# Patient Record
Sex: MALE | Race: WHITE | Employment: OTHER | URBAN - METROPOLITAN AREA
[De-identification: names, ages, dates, MRNs, and addresses within clinical notes are randomized per-mention and may not be internally consistent; named-entity substitution may affect disease eponyms.]

---

## 2017-12-14 ENCOUNTER — HOSPITAL ENCOUNTER (OUTPATIENT)
Age: 82
Setting detail: OBSERVATION
Discharge: HOME | End: 2017-12-15
Attending: EMERGENCY MEDICINE | Admitting: FAMILY MEDICINE
Payer: MEDICARE

## 2017-12-14 DIAGNOSIS — R04.0 EPISTAXIS: Primary | ICD-10-CM

## 2017-12-14 LAB
ABSOLUTE EOS #: 0.13 K/UL (ref 0–0.44)
ABSOLUTE IMMATURE GRANULOCYTE: <0.03 K/UL (ref 0–0.3)
ABSOLUTE LYMPH #: 1.08 K/UL (ref 1.1–3.7)
ABSOLUTE MONO #: 0.56 K/UL (ref 0.1–1.2)
BASOPHILS # BLD: 0 % (ref 0–2)
BASOPHILS ABSOLUTE: <0.03 K/UL (ref 0–0.2)
DIFFERENTIAL TYPE: ABNORMAL
EOSINOPHILS RELATIVE PERCENT: 2 % (ref 1–4)
HCT VFR BLD CALC: 38.9 % (ref 40.7–50.3)
HEMOGLOBIN: 12.6 G/DL (ref 13–17)
IMMATURE GRANULOCYTES: 0 %
INR BLD: 1.6
LYMPHOCYTES # BLD: 16 % (ref 24–43)
MCH RBC QN AUTO: 30.9 PG (ref 25.2–33.5)
MCHC RBC AUTO-ENTMCNC: 32.4 G/DL (ref 28.4–34.8)
MCV RBC AUTO: 95.3 FL (ref 82.6–102.9)
MONOCYTES # BLD: 8 % (ref 3–12)
PDW BLD-RTO: 13.8 % (ref 11.8–14.4)
PLATELET # BLD: 171 K/UL (ref 138–453)
PLATELET ESTIMATE: ABNORMAL
PMV BLD AUTO: 9.9 FL (ref 8.1–13.5)
PROTHROMBIN TIME: 16.9 SEC (ref 9.4–12.6)
RBC # BLD: 4.08 M/UL (ref 4.21–5.77)
RBC # BLD: ABNORMAL 10*6/UL
SEG NEUTROPHILS: 74 % (ref 36–65)
SEGMENTED NEUTROPHILS ABSOLUTE COUNT: 5.08 K/UL (ref 1.5–8.1)
WBC # BLD: 6.9 K/UL (ref 3.5–11.3)
WBC # BLD: ABNORMAL 10*3/UL

## 2017-12-14 PROCEDURE — 30901 CONTROL OF NOSEBLEED: CPT

## 2017-12-14 PROCEDURE — G0378 HOSPITAL OBSERVATION PER HR: HCPCS

## 2017-12-14 PROCEDURE — 85610 PROTHROMBIN TIME: CPT

## 2017-12-14 PROCEDURE — 99284 EMERGENCY DEPT VISIT MOD MDM: CPT

## 2017-12-14 PROCEDURE — 6370000000 HC RX 637 (ALT 250 FOR IP): Performed by: STUDENT IN AN ORGANIZED HEALTH CARE EDUCATION/TRAINING PROGRAM

## 2017-12-14 PROCEDURE — 2580000003 HC RX 258: Performed by: NURSE PRACTITIONER

## 2017-12-14 PROCEDURE — 85025 COMPLETE CBC W/AUTO DIFF WBC: CPT

## 2017-12-14 PROCEDURE — 2500000003 HC RX 250 WO HCPCS: Performed by: STUDENT IN AN ORGANIZED HEALTH CARE EDUCATION/TRAINING PROGRAM

## 2017-12-14 RX ORDER — SODIUM CHLORIDE 9 MG/ML
INJECTION, SOLUTION INTRAVENOUS CONTINUOUS
Status: DISCONTINUED | OUTPATIENT
Start: 2017-12-14 | End: 2017-12-15 | Stop reason: HOSPADM

## 2017-12-14 RX ORDER — WATER 1000 ML/1000ML
30 INJECTION, SOLUTION INTRAVENOUS ONCE
Status: COMPLETED | OUTPATIENT
Start: 2017-12-14 | End: 2017-12-14

## 2017-12-14 RX ORDER — MORPHINE SULFATE 4 MG/ML
4 INJECTION, SOLUTION INTRAMUSCULAR; INTRAVENOUS
Status: DISCONTINUED | OUTPATIENT
Start: 2017-12-14 | End: 2017-12-15 | Stop reason: HOSPADM

## 2017-12-14 RX ORDER — FINASTERIDE 5 MG/1
5 TABLET, FILM COATED ORAL DAILY
Status: DISCONTINUED | OUTPATIENT
Start: 2017-12-14 | End: 2017-12-15 | Stop reason: HOSPADM

## 2017-12-14 RX ORDER — LIDOCAINE HYDROCHLORIDE 20 MG/ML
1 INJECTION, SOLUTION INFILTRATION; PERINEURAL ONCE
Status: COMPLETED | OUTPATIENT
Start: 2017-12-14 | End: 2017-12-14

## 2017-12-14 RX ORDER — ESCITALOPRAM OXALATE 10 MG/1
10 TABLET ORAL DAILY
Status: DISCONTINUED | OUTPATIENT
Start: 2017-12-14 | End: 2017-12-15 | Stop reason: HOSPADM

## 2017-12-14 RX ORDER — POTASSIUM CHLORIDE 20 MEQ/1
40 TABLET, EXTENDED RELEASE ORAL PRN
Status: DISCONTINUED | OUTPATIENT
Start: 2017-12-14 | End: 2017-12-15 | Stop reason: HOSPADM

## 2017-12-14 RX ORDER — CHOLESTYRAMINE LIGHT 4 G/5.7G
1 POWDER, FOR SUSPENSION ORAL 2 TIMES DAILY
Status: DISCONTINUED | OUTPATIENT
Start: 2017-12-14 | End: 2017-12-15 | Stop reason: HOSPADM

## 2017-12-14 RX ORDER — QUETIAPINE FUMARATE 25 MG/1
50 TABLET, FILM COATED ORAL ONCE
Status: COMPLETED | OUTPATIENT
Start: 2017-12-14 | End: 2017-12-14

## 2017-12-14 RX ORDER — NICOTINE 21 MG/24HR
1 PATCH, TRANSDERMAL 24 HOURS TRANSDERMAL DAILY PRN
Status: DISCONTINUED | OUTPATIENT
Start: 2017-12-14 | End: 2017-12-15 | Stop reason: HOSPADM

## 2017-12-14 RX ORDER — SODIUM CHLORIDE 0.9 % (FLUSH) 0.9 %
10 SYRINGE (ML) INJECTION PRN
Status: DISCONTINUED | OUTPATIENT
Start: 2017-12-14 | End: 2017-12-15 | Stop reason: HOSPADM

## 2017-12-14 RX ORDER — POTASSIUM CHLORIDE 7.45 MG/ML
10 INJECTION INTRAVENOUS PRN
Status: DISCONTINUED | OUTPATIENT
Start: 2017-12-14 | End: 2017-12-15 | Stop reason: HOSPADM

## 2017-12-14 RX ORDER — TAMSULOSIN HYDROCHLORIDE 0.4 MG/1
0.4 CAPSULE ORAL DAILY
Status: DISCONTINUED | OUTPATIENT
Start: 2017-12-14 | End: 2017-12-15 | Stop reason: HOSPADM

## 2017-12-14 RX ORDER — HYDROCODONE BITARTRATE AND ACETAMINOPHEN 5; 325 MG/1; MG/1
1 TABLET ORAL EVERY 4 HOURS PRN
Status: DISCONTINUED | OUTPATIENT
Start: 2017-12-14 | End: 2017-12-15 | Stop reason: HOSPADM

## 2017-12-14 RX ORDER — ONDANSETRON 2 MG/ML
4 INJECTION INTRAMUSCULAR; INTRAVENOUS EVERY 6 HOURS PRN
Status: DISCONTINUED | OUTPATIENT
Start: 2017-12-14 | End: 2017-12-15 | Stop reason: HOSPADM

## 2017-12-14 RX ORDER — OXYBUTYNIN CHLORIDE 5 MG/1
5 TABLET ORAL 2 TIMES DAILY
Status: DISCONTINUED | OUTPATIENT
Start: 2017-12-14 | End: 2017-12-15 | Stop reason: HOSPADM

## 2017-12-14 RX ORDER — DONEPEZIL HYDROCHLORIDE 10 MG/1
20 TABLET, FILM COATED ORAL NIGHTLY
Status: DISCONTINUED | OUTPATIENT
Start: 2017-12-14 | End: 2017-12-15 | Stop reason: HOSPADM

## 2017-12-14 RX ORDER — POTASSIUM CHLORIDE 20MEQ/15ML
40 LIQUID (ML) ORAL PRN
Status: DISCONTINUED | OUTPATIENT
Start: 2017-12-14 | End: 2017-12-15 | Stop reason: HOSPADM

## 2017-12-14 RX ORDER — SODIUM CHLORIDE 0.9 % (FLUSH) 0.9 %
10 SYRINGE (ML) INJECTION EVERY 12 HOURS SCHEDULED
Status: DISCONTINUED | OUTPATIENT
Start: 2017-12-14 | End: 2017-12-15 | Stop reason: HOSPADM

## 2017-12-14 RX ORDER — CHOLECALCIFEROL (VITAMIN D3) 125 MCG
1000 CAPSULE ORAL DAILY
Status: DISCONTINUED | OUTPATIENT
Start: 2017-12-14 | End: 2017-12-15 | Stop reason: HOSPADM

## 2017-12-14 RX ORDER — LEVETIRACETAM 250 MG/1
100 TABLET ORAL DAILY
Status: DISCONTINUED | OUTPATIENT
Start: 2017-12-14 | End: 2017-12-15 | Stop reason: HOSPADM

## 2017-12-14 RX ORDER — ISOSORBIDE MONONITRATE 30 MG/1
30 TABLET, EXTENDED RELEASE ORAL DAILY
Status: DISCONTINUED | OUTPATIENT
Start: 2017-12-14 | End: 2017-12-15 | Stop reason: HOSPADM

## 2017-12-14 RX ORDER — QUETIAPINE FUMARATE 25 MG/1
50 TABLET, FILM COATED ORAL NIGHTLY
Status: DISCONTINUED | OUTPATIENT
Start: 2017-12-14 | End: 2017-12-15 | Stop reason: HOSPADM

## 2017-12-14 RX ORDER — BISACODYL 10 MG
10 SUPPOSITORY, RECTAL RECTAL DAILY PRN
Status: DISCONTINUED | OUTPATIENT
Start: 2017-12-14 | End: 2017-12-15 | Stop reason: HOSPADM

## 2017-12-14 RX ORDER — ACETAMINOPHEN 325 MG/1
650 TABLET ORAL EVERY 4 HOURS PRN
Status: DISCONTINUED | OUTPATIENT
Start: 2017-12-14 | End: 2017-12-15 | Stop reason: HOSPADM

## 2017-12-14 RX ORDER — MORPHINE SULFATE 2 MG/ML
2 INJECTION, SOLUTION INTRAMUSCULAR; INTRAVENOUS
Status: DISCONTINUED | OUTPATIENT
Start: 2017-12-14 | End: 2017-12-15 | Stop reason: HOSPADM

## 2017-12-14 RX ORDER — OXYMETAZOLINE HYDROCHLORIDE 0.05 G/100ML
1 SPRAY NASAL ONCE
Status: COMPLETED | OUTPATIENT
Start: 2017-12-14 | End: 2017-12-14

## 2017-12-14 RX ORDER — MAGNESIUM SULFATE 1 G/100ML
1 INJECTION INTRAVENOUS PRN
Status: DISCONTINUED | OUTPATIENT
Start: 2017-12-14 | End: 2017-12-15 | Stop reason: HOSPADM

## 2017-12-14 RX ADMIN — WATER 30 ML: 100 INJECTION, SOLUTION INTRAVENOUS at 17:00

## 2017-12-14 RX ADMIN — SODIUM CHLORIDE: 9 INJECTION, SOLUTION INTRAVENOUS at 23:00

## 2017-12-14 RX ADMIN — QUETIAPINE FUMARATE 50 MG: 25 TABLET ORAL at 17:49

## 2017-12-14 RX ADMIN — LIDOCAINE HYDROCHLORIDE 1 ML: 20 INJECTION, SOLUTION INFILTRATION; PERINEURAL at 16:30

## 2017-12-14 RX ADMIN — OXYMETAZOLINE HYDROCHLORIDE 1 SPRAY: 5 SPRAY NASAL at 16:30

## 2017-12-14 ASSESSMENT — ENCOUNTER SYMPTOMS
VOMITING: 0
NAUSEA: 0
BACK PAIN: 0
COUGH: 1
COLOR CHANGE: 0
ABDOMINAL PAIN: 0

## 2017-12-14 ASSESSMENT — PAIN SCALES - GENERAL
PAINLEVEL_OUTOF10: 3
PAINLEVEL_OUTOF10: 0

## 2017-12-14 NOTE — ED NOTES
Pt here via EMS for epistaxis. Pt was at [facility] ER yesterday, nasal tissue was cauterized and packed, pt was then sent home. Pt family reports that pt has alzheimer's , and had pulled rhino packing out last night. Epistaxis has been intermittent since then. Pt denies any pain. ECF reports that pt has had black tarry stool. Pt denies any pain, pt is pleasantly confused. Pt family present and demanding answers as to why he is having continued nosebleeds. Will continue to monitor.      Evangelina Irwin RN  12/14/17 3918

## 2017-12-14 NOTE — ED NOTES
Bed: 34  Expected date:   Expected time:   Means of arrival:   Comments:  [facility]     Joe Mckeon RN  12/14/17 1300

## 2017-12-14 NOTE — ED PROVIDER NOTES
[facility]  Emergency Department Encounter  Emergency Medicine Resident     Pt Name: Elton Adams  MRN: [id number]  Evansgfcrys 5/4/1929  Date of evaluation: 12/14/17  PCP:  Vicenta Garcia DO    CHIEF COMPLAINT       Chief Complaint   Patient presents with    Epistaxis       HISTORY OF PRESENT ILLNESS  (Location/Symptom, Timing/Onset, Context/Setting, Quality, Duration, Modifying Factors, Severity.)      Elton Adams is a 80 y.o. male who presents With epistaxis ×2 days. Patient [place] [facility] [place] ago,  the bleeding was cauterized, packing was placed, and patient was discharged home. Bleeding returned that night, patient was again brought to [facility]. The bleeding was cauterized again, and a balloon packing was placed. Approximately 6:30 this morning patient pulled out the balloon packing, and since then, his nose has been bleeding. His daughter reports significant amount of blood, that is causing him to cough up large clots, with bleeding greater from the right nares versus the left. Patient is on warfarin for history of A. fib, also has a pig valve and pacemaker, last INR was 1.8. Nursing home staff also reported recent black tarry stools, patient does have a remote history of colon cancer. Has a history of Alzheimer's, and is confused and not oriented at baseline, however denies any complaints when asked. Has not mentioned any complaints otherwise to his family. REVIEW OF SYSTEMS    (2-9 systems for level 4, 10 or more for level 5)      Review of Systems   Constitutional: Negative for chills and fever. HENT: Positive for nosebleeds. Negative for congestion and sneezing. Eyes: Negative for visual disturbance. Respiratory: Positive for cough. Gastrointestinal: Negative for abdominal pain, nausea and vomiting. Black stools   Genitourinary: Negative for dysuria. Musculoskeletal: Negative for back pain. Skin: Negative for color change and rash.    Neurological: 4/23/15   Historical Provider, MD   QUEtiapine (SEROQUEL) 50 MG tablet  4/8/15   Historical Provider, MD   warfarin (COUMADIN) 1 MG tablet Take 3 mg by mouth daily  5/3/15   Nicklas Mealing, DO   vitamin B-12 (CYANOCOBALAMIN) 1000 MCG tablet Take 1,000 mcg by mouth daily    Historical Provider, MD   tamsulosin (FLOMAX) 0.4 MG capsule Take 0.4 mg by mouth daily    Historical Provider, MD   donepezil (ARICEPT) 10 MG tablet Take 20 mg by mouth nightly     Nicklas Mealing, DO   oxybutynin (DITROPAN) 5 MG tablet Take 5 mg by mouth 2 times daily    Historical Provider, MD   cholestyramine Debbie Crome) 4 G packet Take 1 packet by mouth 2 times daily 5/19/15   Rj Alejandro MD     PHYSICAL EXAM   (up to 7 for level 4, 8 or more for level 5)      INITIAL VITALS:    weight is 180 lb (81.6 kg). His axillary temperature is 98.2 °F (36.8 °C). His blood pressure is 108/65 and his pulse is 93. His respiration is 17 and oxygen saturation is 93%. Physical Exam   Constitutional: He appears well-developed and well-nourished. HENT:   Head: Normocephalic and atraumatic. Mouth/Throat: Oropharynx is clear and moist.   Nasal clamp in place  Mild amount of blood noted in the posterior pharynx  Blood noted at exterior of R nares, no blood from L nares   Eyes: EOM are normal. Pupils are equal, round, and reactive to light. Neck: Normal range of motion. Neck supple. Cardiovascular: Normal rate, regular rhythm, normal heart sounds and intact distal pulses. Exam reveals no gallop and no friction rub. No murmur heard. Pulmonary/Chest: Effort normal and breath sounds normal.   Abdominal: Soft. Bowel sounds are normal. He exhibits no distension. There is no tenderness. There is no rebound and no guarding. Genitourinary:   Genitourinary Comments: Stool melanotic and guaiac positive   Musculoskeletal: Normal range of motion. He exhibits no edema. Neurological: He is alert. Skin: Skin is warm and dry. No rash noted. Placed This Encounter   Medications    cholestyramine light packet 4 g    donepezil (ARICEPT) tablet 20 mg    escitalopram (LEXAPRO) tablet 10 mg    finasteride (PROSCAR) tablet 5 mg    isosorbide mononitrate (IMDUR) extended release tablet 30 mg    metoprolol tartrate (LOPRESSOR) tablet 25 mg    oxybutynin (DITROPAN) tablet 5 mg    QUEtiapine (SEROQUEL) tablet 50 mg    tamsulosin (FLOMAX) capsule 0.4 mg    vitamin B-12 (CYANOCOBALAMIN) tablet 1,000 mcg    levETIRAcetam (KEPPRA) tablet 125 mg    sodium chloride flush 0.9 % injection 10 mL    sodium chloride flush 0.9 % injection 10 mL    OR Linked Order Group     potassium chloride (KLOR-CON M) extended release tablet 40 mEq     potassium chloride 20 MEQ/15ML (10%) oral solution 40 mEq     potassium chloride 10 mEq/100 mL IVPB (Peripheral Line)    magnesium sulfate 1 g in dextrose 5% 100 mL IVPB    acetaminophen (TYLENOL) tablet 650 mg    HYDROcodone-acetaminophen (NORCO) 5-325 MG per tablet 1 tablet    OR Linked Order Group     morphine injection 2 mg     morphine (PF) injection 4 mg    magnesium hydroxide (MILK OF MAGNESIA) 400 MG/5ML suspension 30 mL    bisacodyl (DULCOLAX) suppository 10 mg    ondansetron (ZOFRAN) injection 4 mg    nicotine (NICODERM CQ) 21 MG/24HR 1 patch     If indicated/pateint smokes    oxymetazoline (AFRIN) 0.05 % nasal spray 1 spray    lidocaine 2 % injection 1 mL    sterile water injection 30 mL    QUEtiapine (SEROQUEL) tablet 50 mg    0.9 % sodium chloride infusion     DDX: anterior vs posterior nasal bleed, on coumadin    DIAGNOSTIC RESULTS      LABS:  Results for orders placed or performed during the hospital encounter of 12/14/17   Protime-INR   Result Value Ref Range    Protime 16.9 (H) 9.4 - 12.6 sec    INR 1.6    CBC WITH AUTO DIFFERENTIAL   Result Value Ref Range    WBC 6.9 3.5 - 11.3 k/uL    RBC 4.08 (L) 4.21 - 5.77 m/uL    Hemoglobin 12.6 (L) 13.0 - 17.0 g/dL    Hematocrit 38.9 (L) 40.7 - 50.3 %

## 2017-12-15 VITALS
TEMPERATURE: 98.6 F | OXYGEN SATURATION: 95 % | SYSTOLIC BLOOD PRESSURE: 155 MMHG | BODY MASS INDEX: 25.83 KG/M2 | HEART RATE: 100 BPM | DIASTOLIC BLOOD PRESSURE: 90 MMHG | WEIGHT: 180 LBS | RESPIRATION RATE: 16 BRPM

## 2017-12-15 LAB
ALBUMIN SERPL-MCNC: 3.4 G/DL (ref 3.5–5.2)
ALBUMIN/GLOBULIN RATIO: 1.4 (ref 1–2.5)
ALP BLD-CCNC: 83 U/L (ref 40–129)
ALT SERPL-CCNC: 8 U/L (ref 5–41)
ANION GAP SERPL CALCULATED.3IONS-SCNC: 16 MMOL/L (ref 9–17)
AST SERPL-CCNC: 10 U/L
BILIRUB SERPL-MCNC: 1.78 MG/DL (ref 0.3–1.2)
BUN BLDV-MCNC: 19 MG/DL (ref 8–23)
BUN/CREAT BLD: ABNORMAL (ref 9–20)
CALCIUM SERPL-MCNC: 7.9 MG/DL (ref 8.6–10.4)
CHLORIDE BLD-SCNC: 103 MMOL/L (ref 98–107)
CO2: 23 MMOL/L (ref 20–31)
CREAT SERPL-MCNC: 0.95 MG/DL (ref 0.7–1.2)
GFR AFRICAN AMERICAN: >60 ML/MIN
GFR NON-AFRICAN AMERICAN: >60 ML/MIN
GFR SERPL CREATININE-BSD FRML MDRD: ABNORMAL ML/MIN/{1.73_M2}
GFR SERPL CREATININE-BSD FRML MDRD: ABNORMAL ML/MIN/{1.73_M2}
GLUCOSE BLD-MCNC: 121 MG/DL (ref 70–99)
HCT VFR BLD CALC: 32.8 % (ref 40.7–50.3)
HEMOGLOBIN: 10.7 G/DL (ref 13–17)
INR BLD: 1.5
MCH RBC QN AUTO: 31 PG (ref 25.2–33.5)
MCHC RBC AUTO-ENTMCNC: 32.6 G/DL (ref 28.4–34.8)
MCV RBC AUTO: 95.1 FL (ref 82.6–102.9)
PDW BLD-RTO: 13.9 % (ref 11.8–14.4)
PLATELET # BLD: 148 K/UL (ref 138–453)
PMV BLD AUTO: 10.1 FL (ref 8.1–13.5)
POTASSIUM SERPL-SCNC: 3.9 MMOL/L (ref 3.7–5.3)
PROTHROMBIN TIME: 15.9 SEC (ref 9.4–12.6)
RBC # BLD: 3.45 M/UL (ref 4.21–5.77)
SODIUM BLD-SCNC: 142 MMOL/L (ref 135–144)
TOTAL PROTEIN: 5.8 G/DL (ref 6.4–8.3)
WBC # BLD: 8.2 K/UL (ref 3.5–11.3)

## 2017-12-15 PROCEDURE — 97530 THERAPEUTIC ACTIVITIES: CPT

## 2017-12-15 PROCEDURE — G8978 MOBILITY CURRENT STATUS: HCPCS

## 2017-12-15 PROCEDURE — 36415 COLL VENOUS BLD VENIPUNCTURE: CPT

## 2017-12-15 PROCEDURE — 97166 OT EVAL MOD COMPLEX 45 MIN: CPT

## 2017-12-15 PROCEDURE — 80053 COMPREHEN METABOLIC PANEL: CPT

## 2017-12-15 PROCEDURE — G8987 SELF CARE CURRENT STATUS: HCPCS

## 2017-12-15 PROCEDURE — 85027 COMPLETE CBC AUTOMATED: CPT

## 2017-12-15 PROCEDURE — 85610 PROTHROMBIN TIME: CPT

## 2017-12-15 PROCEDURE — G0378 HOSPITAL OBSERVATION PER HR: HCPCS

## 2017-12-15 PROCEDURE — 97162 PT EVAL MOD COMPLEX 30 MIN: CPT

## 2017-12-15 PROCEDURE — 99222 1ST HOSP IP/OBS MODERATE 55: CPT | Performed by: FAMILY MEDICINE

## 2017-12-15 PROCEDURE — 97535 SELF CARE MNGMENT TRAINING: CPT

## 2017-12-15 PROCEDURE — G8988 SELF CARE GOAL STATUS: HCPCS

## 2017-12-15 PROCEDURE — G8979 MOBILITY GOAL STATUS: HCPCS

## 2017-12-15 NOTE — CARE COORDINATION
Attempt to discuss discharge plan with patient- patient has dementia and is unable to carry conversation. Chart notes indicate patient is from [place]- called facility- verified patient is a long term bed hold and can return when medically cleared for discharge.

## 2017-12-15 NOTE — DISCHARGE SUMMARY
is  40 mins in patient examination, evaluation, counseling as well as medication reconciliation, prescriptions for required medications, discharge plan and follow up. Electronically signed by   Rain Pierson MD  12/15/2017  9:37 AM      Thank you Dr. Lavon Diaz DO for the opportunity to be involved in this patient's care.

## 2017-12-15 NOTE — FLOWSHEET NOTE
Spoke with Dr. Rosa Mcdermott regarding discharge plans. Per Dr. Rosa Mcdermott the patient needs to be off his anticoagulents for 7 days for nose bleed sight to heal . Patient would need to be seen in the office of Dr Rosa Mcdermott on Monday December 18,2017.

## 2017-12-15 NOTE — PROGRESS NOTES
Occupational Therapy   Occupational Therapy Initial Assessment  Date: 12/15/2017   Patient Name: Varghese Valerio  MRN: [id number]     : 1929    Patient Diagnosis(es): The encounter diagnosis was Epistaxis. has a past medical history of Atrial fibrillation ([facility] [address] BPH (benign prostatic hyperplasia); Dementia due to Alzheimer's disease; Diarrhea; Dyslipidemia; Gastritis; History of colon cancer; and Hypertension. has a past surgical history that includes Colonoscopy () and Pacemaker insertion (). Restrictions  Restrictions/Precautions  Restrictions/Precautions: Fall Risk  Required Braces or Orthoses?: No  Position Activity Restriction  Other position/activity restrictions: up with assist    Subjective   General  Chart Reviewed: No  Patient assessed for rehabilitation services?: Yes  Family / Caregiver Present: No  General Comment  Comments: RN ok'd for therapy this date. Pt agreeable to participate in session and cooperative throughout. Pain Assessment  Patient Currently in Pain: No  Pain Assessment: 0-10  Pain Level: 0     Social/Functional History  Social/Functional History  Lives With: Other (comment)  Type of Home: Facility  Home Layout: One level  Home Equipment: Cane  ADL Assistance: Needs assistance (Pt reports he does get assistance with bathing and dressing at the facility)  Homemaking Assistance: Needs assistance  Homemaking Responsibilities: No (facility performs all)  Ambulation Assistance: Needs assistance (Pt reports he walks to the bathroom with assist from the staff at facility. Pt reports someone is always with him when he gets up)  Transfer Assistance: Needs assistance  Active : No  IADL Comments: Pt is a questionable historian this date but is able to answer simple questions appropriately this date.       Objective   Vision: Within Functional Limits  Hearing: Within functional limits    Orientation  Overall Orientation Status: Impaired  Orientation Level: Oriented to Status (): At least 60 percent but less than 80 percent impaired, limited or restricted  Self Care Goal Status (): At least 40 percent but less than 60 percent impaired, limited or restricted    Goals  Short term goals  Time Frame for Short term goals: Pt will, by discharge:  Short term goal 1: perform bed mobility with CGA and VCs PRN for increased independence with functional transfers  Short term goal 2: perform functional transfers with min A and use of least restrictive AD  Short term goal 3: demo 3+ minutes standing tolerance with use of least restrictive AD for increased participation in ADLs  Short term goal 4: perform feeding/grooming tasks with setup and VCs PRN  Short term goal 5: perform UB ADL tasks with min A and VCs PRN     Therapy Time   Individual Concurrent Group Co-treatment   Time In 0957         Time Out 1018         Minutes 21            Discharge recommendations discussed with patient during initial evaluation.     JOE Cerda/L

## 2017-12-15 NOTE — FLOWSHEET NOTE
Patient given complete bed bath and brief changed. Lotion and powder to extremities. Tolerated well. Transport arrived at completion.

## 2017-12-15 NOTE — PROGRESS NOTES
all)  Ambulation Assistance: Needs assistance (Pt reports he walks to the bathroom with assist from the staff at facility. Pt reports someone is always with him when he gets up)  Transfer Assistance: Needs assistance  Active : No  IADL Comments: Pt is a questionable historian this date but is able to answer simple questions appropriately this date. Objective          AROM RLE (degrees)  RLE AROM: WFL  AROM LLE (degrees)  LLE AROM : WFL  AROM RUE (degrees)  RUE AROM : WFL  AROM LUE (degrees)  LUE AROM : WFL  Strength RLE  Comment: grossly 4-/5  Strength LLE  Comment: grossly 4-/5        Bed mobility  Rolling to Left: Minimal assistance  Rolling to Right: Minimal assistance  Supine to Sit: Moderate assistance  Sit to Supine: Moderate assistance  Scooting: Moderate assistance  Transfers  Sit to Stand: Moderate Assistance (x2)  Stand to sit: Moderate Assistance (x2)  Ambulation  Ambulation?: No (Pt requests to sit down.)  Stairs/Curb  Stairs?: No     Balance  Sitting - Static: Fair;+  Sitting - Dynamic: Fair;+  Standing - Static: Poor;+  Standing - Dynamic: Poor;+  Comments: Pt able to sit EOB x4 minutes with CGA. Pt able to stand x45 seconds mod Ax2. Assessment   Body structures, Functions, Activity limitations: Decreased functional mobility ; Decreased strength;Decreased safe awareness;Decreased cognition;Decreased balance  Assessment: Pt with noted impaired balance in both seated and standing. Pt with noted impaired cognition and h/o dementia. Pt able to stand with mod x2 and sit EOB with CGA. Pt is from a facility and plans to return to facility. Pt would benefit from additional therapy to address functional mobility deficits.    Prognosis: Fair  Decision Making: Medium Complexity  Patient Education: Educated on mobility  Barriers to Learning: Cognition-dementia  REQUIRES PT FOLLOW UP: Yes  Activity Tolerance  Activity Tolerance: Patient limited by cognitive status  PT Equipment

## 2017-12-15 NOTE — CONSULTS
Department of Otolaryngology    Assesment/Plan:  Epistaxis- NO ANTICOAGULATION. Considering his age, mental status, and repeat epistaxis he is not a good candidate for anitcoagulation. At the minimum he will need a week off of anticoagulation to allow his nasal mucosa to build a stable clot. CHIEF COMPLAINT:  Nose bleed    HISTORY OF PRESENT ILLNESS:              Char Mistry  is a 80 y.o. male with dementia and nose bleeds. Started 2 days ago. He keeps pulling out his packing. He was on anticoagulation.       Past Medical History:        Diagnosis Date    Atrial fibrillation ([address]     BPH (benign prostatic hyperplasia)     Dementia due to Alzheimer's disease     Diarrhea     Dyslipidemia     Gastritis     History of colon cancer 1997    Hypertension      Past Surgical History:        Procedure Laterality Date    COLONOSCOPY  2013    PACEMAKER INSERTION  2009     Current Medications:   Current Facility-Administered Medications: cholestyramine light packet 4 g, 1 packet, Oral, BID  donepezil (ARICEPT) tablet 20 mg, 20 mg, Oral, Nightly  escitalopram (LEXAPRO) tablet 10 mg, 10 mg, Oral, Daily  finasteride (PROSCAR) tablet 5 mg, 5 mg, Oral, Daily  isosorbide mononitrate (IMDUR) extended release tablet 30 mg, 30 mg, Oral, Daily  metoprolol tartrate (LOPRESSOR) tablet 25 mg, 25 mg, Oral, BID  oxybutynin (DITROPAN) tablet 5 mg, 5 mg, Oral, BID  QUEtiapine (SEROQUEL) tablet 50 mg, 50 mg, Oral, Nightly  tamsulosin (FLOMAX) capsule 0.4 mg, 0.4 mg, Oral, Daily  vitamin B-12 (CYANOCOBALAMIN) tablet 1,000 mcg, 1,000 mcg, Oral, Daily  levETIRAcetam (KEPPRA) tablet 125 mg, 125 mg, Oral, Daily  sodium chloride flush 0.9 % injection 10 mL, 10 mL, Intravenous, 2 times per day  sodium chloride flush 0.9 % injection 10 mL, 10 mL, Intravenous, PRN  potassium chloride (KLOR-CON M) extended release tablet 40 mEq, 40 mEq, Oral, PRN **OR** potassium chloride 20 MEQ/15ML (10%) oral solution 40 mEq, 40 mEq, Oral, PRN kg)   SpO2 93%   BMI 25.83 kg/m²       CONSTITUTIONAL:  Sleeping no apparent distress, and appears stated age  EYES:  Lids and lashes normal,   ENT:  Normocephalic, without obvious abnormality, atraumatic, sinuses nontender on palpation, external ears without lesions, oral pharynx with moist mucus membranes, tonsils without erythema or exudates, gums normal. Right nasal passage with a tampon in place  NECK:  Supple, symmetrical, trachea midline, no adenopathy, thyroid symmetric, not enlarged and no tenderness, skin normal  MUSCULOSKELETAL:  There is no redness, warmth, or swelling of the joints. Full range of motion noted. Motor strength is 5 out of 5 all extremities bilaterally. Tone is normal.  NEUROLOGIC:    Cranial nerves II-XII are grossly intact.       DATA:    Labs and Radiology reports/films reviewed

## 2017-12-15 NOTE — H&P
[facility]    HISTORY AND PHYSICAL EXAMINATION            Date:   12/15/2017  Patient name:  Patricia Toribio  Date of admission:  12/14/2017 12:59 PM  MRN:   [id number]  Account:  [de-identified]  YOB: 1929  PCP:    Bear Castro DO  Room:   6879/2510-19  Code Status:    Prior    Chief Complaint:     Chief Complaint   Patient presents with    Epistaxis       History Obtained From:     patient, electronic medical record, patient is unable to provide history discitis name    History of Present Illness: The patient is a 80 y.o. Unavailable/unknown male who presents with Epistaxis   and he is admitted to the hospital for the management of  Uncontrolled Epistaxis. Patient presented to ER from his skilled nursing facility with uncontrolled epistaxis, episodes started 3 days ago when he presented to [facility] with bleeding, was  Cauterized, returned again later in the day and was again in ER where bleeding was cauterized again and packed with ballon. On morning of admission patient was noted to have significant bleeding that was uncontrolled after he pulled the packing balloon . In our ER patient had afrin and topical lidocaine with packing per ENT recommendations and was admitted for observation.   Co morbidities include afib S/P pacemaker placement, Hypertension, hyperlipidemia, dementia, BPH and history of colon cancer    Past Medical History:     Past Medical History:   Diagnosis Date    Atrial fibrillation ([address]     BPH (benign prostatic hyperplasia)     Dementia due to Alzheimer's disease     Diarrhea     Dyslipidemia     Gastritis     History of colon cancer 1997    Hypertension         Past Surgical History:     Past Surgical History:   Procedure Laterality Date    COLONOSCOPY  2013    PACEMAKER INSERTION  2009        Medications Prior to Admission:     Prior to Admission medications    Medication Sig Start Date End Date Taking? Authorizing Provider   LEVETIRACETAM PO Take 100 mg by mouth daily   Yes Merril Polite, DO   metoprolol tartrate (LOPRESSOR) 25 MG tablet Take 25 mg by mouth 2 times daily   Yes Merril Polite, DO   escitalopram (LEXAPRO) 10 MG tablet 10 mg  4/23/15   Historical Provider, MD   finasteride (PROSCAR) 5 MG tablet Take 5 mg by mouth daily  4/23/15   Merril Polite, DO   isosorbide mononitrate (IMDUR) 30 MG CR tablet  4/23/15   Historical Provider, MD   QUEtiapine (SEROQUEL) 50 MG tablet  4/8/15   Historical Provider, MD   vitamin B-12 (CYANOCOBALAMIN) 1000 MCG tablet Take 1,000 mcg by mouth daily    Historical Provider, MD   tamsulosin (FLOMAX) 0.4 MG capsule Take 0.4 mg by mouth daily    Historical Provider, MD   donepezil (ARICEPT) 10 MG tablet Take 20 mg by mouth nightly     Merril Polite, DO   oxybutynin (DITROPAN) 5 MG tablet Take 5 mg by mouth 2 times daily    Historical Provider, MD   cholestyramine Salina Horneelman) 4 G packet Take 1 packet by mouth 2 times daily 5/19/15   Lawrence Sher MD        Allergies:     Review of patient's allergies indicates no known allergies. Social History:     Tobacco:    reports that he quit smoking about 3 years ago. His smoking use included Cigars. He does not have any smokeless tobacco history on file. Alcohol:      reports that he drinks about 4.8 oz of alcohol per week . Drug Use:  reports that he does not use drugs. Family History:     History reviewed. No pertinent family history. Review of Systems:     Unobtainable due to patient dementia    Physical Exam:   BP (!) 155/90   Pulse 100   Temp 98.6 °F (37 °C) (Oral)   Resp 16   Wt 180 lb (81.6 kg)   SpO2 95%   BMI 25.83 kg/m²   Temp (24hrs), Av.3 °F (36.8 °C), Min:98.1 °F (36.7 °C), Max:98.6 °F (37 °C)    No results for input(s): POCGLU in the last 72 hours.   No intake or output data in the 24 hours ending 12/15/17 1515    General Appearance:  alert, well appearing, and in no acute 40.7 - 50.3 %    MCV 95.1 82.6 - 102.9 fL    MCH 31.0 25.2 - 33.5 pg    MCHC 32.6 28.4 - 34.8 g/dL    RDW 13.9 11.8 - 14.4 %    Platelets 554 998 - 587 k/uL    MPV 10.1 8.1 - 13.5 fL   Protime-INR    Collection Time: 12/15/17  7:17 AM   Result Value Ref Range    Protime 15.9 (H) 9.4 - 12.6 sec    INR 1.5        Imaging/Diagnostics:      Assessment :      Primary Problem  Epistaxis    Active Hospital Problems    Diagnosis Date Noted    BPH (benign prostatic hyperplasia) [N40.0]     Dementia due to Alzheimer's disease [G30.9, F02.80]     Atrial fibrillation ([address] [I48.91]     Dyslipidemia [E78.5]     Hypertension [I10]     Epistaxis [R04.0] 12/14/2017    History of colon cancer [Z85.038] 01/01/1997       Plan:     Patient status Admit as observation in the  Med/Surge    #1 epistaxis: Keep nasal packing, per ENT recommendations, can be discharged with nasal packing was no anticoagulation for at least 1 week, minimal drop in hemoglobin overnight    A. fib: Hold warfarin per ENT recommendations    dementia: Continue home meds    HTN: continue home meds    HPL: continue home meds    DVT prophylaxis: Hold given nasal epistaxis    Discharge planning if continued to be stable        Consultations:   IP CONSULT TO OTOLARYNGOLOGY  IP CONSULT TO HOSPITALIST  IP CONSULT TO [facility]     Patient is admitted as inpatient status because of co-morbidities listed above, severity of signs and symptoms as outlined, requirement for current medical therapies and most importantly because of direct risk to patient if care not provided in a hospital setting.     Krysten Correa MD  12/15/2017  3:15 PM    Copy sent to Dr. Bear Castro DO